# Patient Record
Sex: MALE | Race: WHITE | NOT HISPANIC OR LATINO | URBAN - METROPOLITAN AREA
[De-identification: names, ages, dates, MRNs, and addresses within clinical notes are randomized per-mention and may not be internally consistent; named-entity substitution may affect disease eponyms.]

---

## 2018-10-10 ENCOUNTER — AMBULATORY SURGICAL CENTER (OUTPATIENT)
Dept: URBAN - METROPOLITAN AREA SURGERY 10 | Facility: SURGERY | Age: 48
End: 2018-10-10

## 2018-10-10 ENCOUNTER — OFFICE (OUTPATIENT)
Dept: URBAN - METROPOLITAN AREA PATHOLOGY 4 | Facility: PATHOLOGY | Age: 48
End: 2018-10-10
Payer: COMMERCIAL

## 2018-10-10 DIAGNOSIS — R10.32 LEFT LOWER QUADRANT PAIN: ICD-10-CM

## 2018-10-10 DIAGNOSIS — K63.5 POLYP OF COLON: ICD-10-CM

## 2018-10-10 DIAGNOSIS — K57.90 DIVERTICULOSIS OF INTESTINE, PART UNSPECIFIED, WITHOUT PERFO: ICD-10-CM

## 2018-10-10 DIAGNOSIS — K57.92 DIVERTICULITIS OF INTESTINE, PART UNSPECIFIED, WITHOUT PERFO: ICD-10-CM

## 2018-10-10 DIAGNOSIS — Z80.9 FAMILY HISTORY OF MALIGNANT NEOPLASM, UNSPECIFIED: ICD-10-CM

## 2018-10-10 PROCEDURE — 45385 COLONOSCOPY W/LESION REMOVAL: CPT | Performed by: INTERNAL MEDICINE

## 2018-10-10 PROCEDURE — 88305 TISSUE EXAM BY PATHOLOGIST: CPT | Performed by: INTERNAL MEDICINE

## 2021-09-03 ENCOUNTER — OFFICE VISIT (OUTPATIENT)
Dept: INTERNAL MEDICINE | Facility: CLINIC | Age: 51
End: 2021-09-03

## 2021-09-03 VITALS
HEART RATE: 100 BPM | WEIGHT: 192 LBS | OXYGEN SATURATION: 98 % | RESPIRATION RATE: 18 BRPM | SYSTOLIC BLOOD PRESSURE: 146 MMHG | TEMPERATURE: 97.6 F | DIASTOLIC BLOOD PRESSURE: 82 MMHG | HEIGHT: 71 IN | BODY MASS INDEX: 26.88 KG/M2

## 2021-09-03 DIAGNOSIS — E83.110 HEREDITARY HEMOCHROMATOSIS (HCC): Primary | ICD-10-CM

## 2021-09-03 DIAGNOSIS — Z86.010 HISTORY OF COLON POLYPS: ICD-10-CM

## 2021-09-03 DIAGNOSIS — M54.31 SCIATICA OF RIGHT SIDE: ICD-10-CM

## 2021-09-03 PROCEDURE — 99204 OFFICE O/P NEW MOD 45 MIN: CPT | Performed by: INTERNAL MEDICINE

## 2021-09-03 RX ORDER — METHYLPREDNISOLONE 4 MG/1
TABLET ORAL
Qty: 21 TABLET | Refills: 0 | Status: SHIPPED | OUTPATIENT
Start: 2021-09-03 | End: 2021-09-08

## 2021-09-03 NOTE — PROGRESS NOTES
Internal Medicine New Patient  Zhang Oates is a 51 y.o. male who presents today to establish care and with concerns as outlined below.    Chief Complaint  Chief Complaint   Patient presents with   • Establish Care   • Right leg pain   • Back Pain        HPI  Mr. Oates (epping) comes in today to establish care. He was last a patient of Dr. Juan Peraza. He has hereditary hemochromatosis diagnosed in his early 40s. He has had genetic testing confirming dose. He is seen in [facility] where he has phlebotomy to keep iron levels down. He also limits dietary iron intake. He has an appointment with labs this month. He has been told he has liver disease. He has quit drinking alcohol x6y due to this diagnosis. He reports that repeat CT scan 6-8 months ago shows improvement in liver. He denies history of HTN. He has had decreased sensation in R outer thigh for several years. He has had previous evaluation by Dr. Peraza. He notes throbbing pain increasing over the last few weeks radiating down to knee and ankle. He has been stretching his back on the floor with improvement. He has also been taking ibuprofen when needed. 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 He has had diverticulitis twice both managed with abx. No surgery. Had recent colonoscopy and has history of polyps.       Review of Systems  Review of Systems   Constitutional: Negative.    Respiratory: Negative.    Cardiovascular: Negative.    Gastrointestinal: Negative.    Musculoskeletal: Positive for arthralgias and myalgias. Negative for back pain.   Skin: Negative.    Neurological: Negative.         Past Medical History  Past Medical History:   Diagnosis Date   • Arthritis    • Hemochromatosis         Surgical History  Past Surgical History:   Procedure Laterality Date   • TONSILLECTOMY          Family History  Family History   Family history unknown: Yes        Social History  Social History     Socioeconomic  "History   • Marital status:      Spouse name: Not on file   • Number of children: Not on file   • Years of education: Not on file   • Highest education level: Not on file   Tobacco Use   • Smoking status: Former Smoker     Types: Cigarettes     Quit date:      Years since quittin.6   • Smokeless tobacco: Never Used   Substance and Sexual Activity   • Alcohol use: Not Currently   • Drug use: Defer   • Sexual activity: Defer        Current Medications  No current outpatient medications on file prior to visit.     No current facility-administered medications on file prior to visit.       Allergies  Allergies   Allergen Reactions   • Penicillins Rash        Objective  Visit Vitals  /82   Pulse 100   Temp 97.6 °F (36.4 °C)   Resp 18   Ht 180.3 cm (71\")   Wt 87.1 kg (192 lb)   SpO2 98%   BMI 26.78 kg/m²        Physical Exam  Physical Exam  Vitals and nursing note reviewed.   Constitutional:       General: He is not in acute distress.     Appearance: Normal appearance. He is well-developed and normal weight. He is not ill-appearing, toxic-appearing or diaphoretic.   HENT:      Head: Normocephalic and atraumatic.      Right Ear: External ear normal.      Left Ear: External ear normal.      Nose: Nose normal.   Eyes:      General: No scleral icterus.     Conjunctiva/sclera: Conjunctivae normal.   Cardiovascular:      Rate and Rhythm: Normal rate and regular rhythm.      Heart sounds: Normal heart sounds. No murmur heard.     Pulmonary:      Effort: Pulmonary effort is normal. No respiratory distress.      Breath sounds: Normal breath sounds.   Abdominal:      General: Bowel sounds are normal. There is no distension.      Palpations: Abdomen is soft. There is no mass.      Tenderness: There is no abdominal tenderness.      Comments: No hepatomegaly   Musculoskeletal:         General: No swelling, tenderness or deformity.      Cervical back: Neck supple.      Right lower leg: No edema.      Left lower leg: " No edema.   Lymphadenopathy:      Cervical: No cervical adenopathy.   Skin:     General: Skin is warm and dry.      Coloration: Skin is not jaundiced.      Findings: No rash.   Neurological:      General: No focal deficit present.      Mental Status: He is alert and oriented to person, place, and time.      Gait: Gait normal.   Psychiatric:         Mood and Affect: Mood normal.         Behavior: Behavior normal.         Thought Content: Thought content normal.         Judgment: Judgment normal.          Results  No results found for this or any previous visit.     Assessment and Plan  Diagnoses and all orders for this visit:    Hereditary hemochromatosis (CMS/HCC)  - Follows with provider at [facility].  - Therapeutic phlebotomy PRN and low iron diet    History of colon polyps  - Reports recent colonoscopy and hx of polyps. Cannot recall provider that completed procedure today but will notify office with name so records can be obtained.    Sciatica of right side  - R sided hip, thigh, knee, calf, ankle pain.   - Exam unremarkable  - Suspect sciatica  - Medrol dosepak for pain relief and PT referral  - If not improving in 6-8 weeks despite PT can order MRI L spine      Health Maintenance   Topic Date Due   • COLORECTAL CANCER SCREENING  Never done   • ANNUAL PHYSICAL  Never done   • COVID-19 Vaccine (1) Never done   • TDAP/TD VACCINES (1 - Tdap) Never done   • ZOSTER VACCINE (1 of 2) Never done   • HEPATITIS C SCREENING  Never done   • INFLUENZA VACCINE  10/01/2021   • Pneumococcal Vaccine 0-64  Aged Out     Health Maintenance  - Colonoscopy: Need records.  - AAA screening: at age 65  - HCV: likely done previously given liver hx  - Immunizations: Declines flu vaccine. Discussed COVID vaccine in depth, no contraindications and recommend vaccination. Will not provide exemption based upon CDC guidelines.  - Depression screening: screen next visit    Return in about 6 months (around 3/3/2022) for Annual.

## 2021-09-14 ENCOUNTER — TREATMENT (OUTPATIENT)
Dept: PHYSICAL THERAPY | Facility: CLINIC | Age: 51
End: 2021-09-14

## 2021-09-14 DIAGNOSIS — M54.41 CHRONIC RIGHT-SIDED LOW BACK PAIN WITH RIGHT-SIDED SCIATICA: Primary | ICD-10-CM

## 2021-09-14 DIAGNOSIS — G89.29 CHRONIC RIGHT-SIDED LOW BACK PAIN WITH RIGHT-SIDED SCIATICA: Primary | ICD-10-CM

## 2021-09-14 PROCEDURE — 97110 THERAPEUTIC EXERCISES: CPT | Performed by: PHYSICAL THERAPIST

## 2021-09-14 PROCEDURE — 97162 PT EVAL MOD COMPLEX 30 MIN: CPT | Performed by: PHYSICAL THERAPIST

## 2021-09-14 NOTE — PROGRESS NOTES
Physical Therapy Initial Evaluation and Plan of Care        Patient: Zhang Oates   : 1970  Diagnosis/ICD-10 Code:  Chronic right-sided low back pain with right-sided sciatica [M54.41, G89.29]  Referring practitioner: Patti Pereira MD  Date of Initial Visit: 2021  Today's Date: 2021  Patient seen for 1 sessions           Subjective Evaluation    History of Present Illness  Mechanism of injury: Years of lumbar pain. Has to had walk a lot at work, but last year new job requires more lifting/pushing/pulling/straining.      CURRENT COMPLAINT  Intermittent right lumbar pain. Right lateral thigh, constant numbness, intermittent lateral calf to ankle pain. No foot symptoms.    Pain  Current pain ratin  At best pain ratin  At worst pain ratin  Quality: dull ache and radiating  Aggravating factors: ambulation and lifting  Progression: worsening    Hand dominance: right             Objective          Postural Observations  Seated posture: good  Standing posture: good    Additional Postural Observation Details  Mild left lateral shift  Left paraspinal hypertrophy      Tenderness     Additional Tenderness Details  Lower right lumbar about L4 to S1 tender    Neurological Testing     Reflexes   Left   Patellar (L4): normal (2+)  Achilles (S1): normal (2+)    Right   Patellar (L4): normal (2+)  Achilles (S1): normal (2+)    Active Range of Motion     Lumbar   Flexion: 65 degrees   Extension: 25 degrees   Left lateral flexion: 30 degrees   Right lateral flexion: 30 degrees   Left rotation: WFL  Right rotation: WFL    Strength/Myotome Testing     Lumbar   Left   Normal strength    Right   Normal strength    Tests     Lumbar     Left   Negative crossed SLR and quadrant.     Right   Positive passive SLR and quadrant.   Negative crossed SLR.     Additional Tests Details  ISAI X 10=NE  ISAI X 10=NE  RFIL X 10= decrease LBP, no effect on LE symptoms  REIL x 10= no effect on symptoms but ROM did  increase  Prone LE distraction: decreases lumbar pain  Modified Oswestry: 6%          Assessment & Plan     Assessment  Impairments: abnormal or restricted ROM, activity intolerance and pain with function  Assessment details: 51 year old male presenting with chronic lumbar pain with right LE radiculopathy. He has signs and symptoms indicating possible Right L5 nerve root irritation.  He positive neural tension along right sciatic nerve.  Suspect a disc derangement.  Reflexes are intact and symmetrical.  He needs improve lumbar ROM and LE flexibility, then progress towards functional lumbar stabilization.   Prognosis: good  Functional Limitations: lifting, walking, uncomfortable because of pain and stooping  Goals  Plan Goals: STG to be met in 4 weeks  1. Pt has full right SLR without neural tension signs.  2. Pt is able to flex lumbar spine 90 degrees without radiating symptoms.  3. Pt is able to walk up to 1 hour at a time with tolerable discomfort.  LTG to be met in 12 weeks  1. Pt is independent with each phase of HEP.  2. Pt is able to keep symptoms from radiating further down right LE.  3. Pt demonstrates excellent lumbar stabilization with lifting.  4. Pt demonstrates understanding of good body mechanics to reduce stress on lower lumbar disc.      Plan  Therapy options: will be seen for skilled physical therapy services  Planned modality interventions: dry needling, ultrasound and traction  Planned therapy interventions: abdominal trunk stabilization, balance/weight-bearing training, body mechanics training, flexibility, functional ROM exercises, joint mobilization, therapeutic activities, stretching, strengthening, spinal/joint mobilization, soft tissue mobilization, postural training, neuromuscular re-education, orthotic fitting/training and manual therapy  Duration in visits: 12  Duration in weeks: 12  Treatment plan discussed with: patient         Access Code: [email or web address]  URL:  [email or web address]  Date: 09/14/2021  Prepared by: Antonio Vazquez    Exercises  Standing Hamstring Stretch with Step - 1 x daily - 7 x weekly - 1 sets - 2 reps - 30 hold  Supine Double Knee to Chest - 1 x daily - 7 x weekly - 1 sets - 15 reps - 5 hold  Supine Lower Trunk Rotation - 1 x daily - 7 x weekly - 1 sets - 15 reps - 5 hold  Child's Pose Stretch - 1 x daily - 7 x weekly - 1 sets - 15 reps - 5 hold  Prone Press Up - 1 x daily - 7 x weekly - 1 sets - 15 reps - 1 hold      Therapeutic Exercise:    20     mins  [id number];       Timed Treatment:   20   mins   Total Treatment:     45   mins    PT SIGNATURE: Antonio Vazquez, PT   DATE TREATMENT INITIATED: 9/14/2021    Initial Certification  Certification Period: 12/13/2021  I certify that the therapy services are furnished while this patient is under my care.  The services outlined above are required by this patient, and will be reviewed every 90 days.     PHYSICIAN: Patti Pereira MD      DATE:     Please sign and return via fax to [phone].. Thank you, [facility].